# Patient Record
Sex: FEMALE | Race: BLACK OR AFRICAN AMERICAN | NOT HISPANIC OR LATINO | Employment: OTHER | URBAN - NONMETROPOLITAN AREA
[De-identification: names, ages, dates, MRNs, and addresses within clinical notes are randomized per-mention and may not be internally consistent; named-entity substitution may affect disease eponyms.]

---

## 2024-05-09 ENCOUNTER — OFFICE VISIT (OUTPATIENT)
Dept: CARDIOLOGY | Facility: CLINIC | Age: 74
End: 2024-05-09
Payer: COMMERCIAL

## 2024-05-09 VITALS
HEART RATE: 98 BPM | DIASTOLIC BLOOD PRESSURE: 82 MMHG | WEIGHT: 222 LBS | SYSTOLIC BLOOD PRESSURE: 146 MMHG | HEIGHT: 58 IN | BODY MASS INDEX: 46.6 KG/M2

## 2024-05-09 DIAGNOSIS — I10 ESSENTIAL HYPERTENSION: ICD-10-CM

## 2024-05-09 DIAGNOSIS — E78.2 MIXED HYPERLIPIDEMIA: ICD-10-CM

## 2024-05-09 DIAGNOSIS — R94.31 ABNORMAL EKG: ICD-10-CM

## 2024-05-09 DIAGNOSIS — Z87.891 FORMER SMOKER: ICD-10-CM

## 2024-05-09 DIAGNOSIS — R06.02 SHORTNESS OF BREATH: Primary | ICD-10-CM

## 2024-05-09 PROCEDURE — 3078F DIAST BP <80 MM HG: CPT | Performed by: INTERNAL MEDICINE

## 2024-05-09 PROCEDURE — 3077F SYST BP >= 140 MM HG: CPT | Performed by: INTERNAL MEDICINE

## 2024-05-09 PROCEDURE — 1160F RVW MEDS BY RX/DR IN RCRD: CPT | Performed by: INTERNAL MEDICINE

## 2024-05-09 PROCEDURE — 1036F TOBACCO NON-USER: CPT | Performed by: INTERNAL MEDICINE

## 2024-05-09 PROCEDURE — 93000 ELECTROCARDIOGRAM COMPLETE: CPT | Performed by: INTERNAL MEDICINE

## 2024-05-09 PROCEDURE — 99204 OFFICE O/P NEW MOD 45 MIN: CPT | Performed by: INTERNAL MEDICINE

## 2024-05-09 PROCEDURE — 1159F MED LIST DOCD IN RCRD: CPT | Performed by: INTERNAL MEDICINE

## 2024-05-09 RX ORDER — LISINOPRIL 5 MG/1
5 TABLET ORAL DAILY
COMMUNITY
Start: 2024-03-25

## 2024-05-09 RX ORDER — DEXTROMETHORPHAN HYDROBROMIDE, GUAIFENESIN 5; 100 MG/5ML; MG/5ML
650 LIQUID ORAL EVERY 8 HOURS PRN
COMMUNITY

## 2024-05-09 RX ORDER — IBUPROFEN 200 MG
200 TABLET ORAL EVERY 6 HOURS PRN
COMMUNITY

## 2024-05-09 RX ORDER — HYDROCHLOROTHIAZIDE 25 MG/1
25 TABLET ORAL DAILY
COMMUNITY
Start: 2024-03-25

## 2024-05-09 RX ORDER — ATORVASTATIN CALCIUM 80 MG/1
80 TABLET, FILM COATED ORAL NIGHTLY
COMMUNITY
Start: 2024-03-28

## 2024-05-09 RX ORDER — METOPROLOL SUCCINATE 25 MG/1
25 TABLET, EXTENDED RELEASE ORAL DAILY
Qty: 30 TABLET | Refills: 7 | Status: SHIPPED | OUTPATIENT
Start: 2024-05-09 | End: 2025-05-09

## 2024-05-09 ASSESSMENT — ENCOUNTER SYMPTOMS: DYSPNEA ON EXERTION: 1

## 2024-05-09 NOTE — PATIENT INSTRUCTIONS
Please bring all medicines, vitamins, and herbal supplements with you when you come to the office.    Prescriptions will not be filled unless you are compliant with your follow up appointments or have a follow up appointment scheduled as per instruction of your physician. Refills should be requested at the time of your visit.     BMI was above normal measurement. Current weight: 101 kg (222 lb)  Weight change since last visit (-) denotes wt loss 222 lbs   Weight loss needed to achieve BMI 25: 102.6 Lbs  Weight loss needed to achieve BMI 30: 78.8 Lbs  Provided instructions on dietary changes  Provided instructions on exercise.    Reduce salt  Lexiscan stress  Toprol  Follow up 3-4m

## 2024-05-09 NOTE — PROGRESS NOTES
Cardiology Consultation- New Consult    Reason for referral: Increasing shortness of breath    HPI: Ana Laura Saldana is a 73 y.o. female with known history of hypertension and hyperlipidemia who recently has been experiencing worsening shortness of breath.  She described almost functional class III.  She denies chest pain.  She denies lightheadedness, dizziness or syncope.  She is compliant with her medication.  She had long history of hypertension and hyperlipidemia.  I saw her about 10 years ago and her cardiac workup was normal.  Her recent echocardiogram, lab work and chest x-ray are all noted and reviewed with her.  Her EKG today showed normal sinus rhythm with poor R wave progression in the anterior lead.  Send reports symptoms of exertional palpitation and rapid heart rate with activity    Assessment    1.  Progressive shortness of breath functional class III with recent echocardiogram showing normal LV systolic function rule out underlying ischemic heart disease  2.  Essential hypertension suboptimally controlled  3.  Hyperlipidemia on treatment  4.  Symptoms of exertional palpitation  5.  Former smoker  6.  Abnormal EKG  7.  Morbid obesity    Assessment    1.  I advised the patient to start taking metoprolol ER 25 mg once daily to optimize blood pressure control  2.  I reviewed with the patient the result of her recent testing including lab work chest x-ray and echocardiogram  3.  I counseled her regarding nonpharmacologic approach for hypertension including salt restriction, dietary modification, DASH diet and the importance of losing weight  4.  Will proceed with Lexiscan myocardial fusion study  5.  Follow-up after testing is done      Past Medical History:   She has no past medical history on file.    Surgical History:   She has a past surgical history that includes  section, classic; [place] tooth extraction; and Cholecystectomy.    Family History:   Family History   Problem Relation Name  "Age of Onset    Cancer Mother      Hyperlipidemia Mother      Heart attack Father         Social History:   Social History     Tobacco Use    Smoking status: Former     Types: Cigarettes    Smokeless tobacco: Never   Substance Use Topics    Alcohol use: Yes     Alcohol/week: 7.0 standard drinks of alcohol     Types: 7 Glasses of wine per week        Allergies:  Patient has no known allergies.     Current Medications:    Current Outpatient Medications:     acetaminophen (Tylenol 8 HOUR) 650 mg ER tablet, Take 1 tablet (650 mg) by mouth every 8 hours if needed for mild pain (1 - 3). Do not crush, chew, or split., Disp: , Rfl:     atorvastatin (Lipitor) 80 mg tablet, Take 1 tablet (80 mg) by mouth once daily at bedtime., Disp: , Rfl:     hydroCHLOROthiazide (HYDRODiuril) 25 mg tablet, Take 1 tablet (25 mg) by mouth once daily., Disp: , Rfl:     ibuprofen 200 mg tablet, Take 1 tablet (200 mg) by mouth every 6 hours if needed for mild pain (1 - 3)., Disp: , Rfl:     lisinopril 5 mg tablet, Take 1 tablet (5 mg) by mouth once daily., Disp: , Rfl:     metoprolol succinate XL (Toprol-XL) 25 mg 24 hr tablet, Take 1 tablet (25 mg) by mouth once daily. Do not crush or chew., Disp: 30 tablet, Rfl: 7     Vitals:  Vitals:    05/09/24 1347 05/09/24 1351   BP: 146/76 146/82   BP Location: Left arm Right arm   Patient Position: Sitting Sitting   Pulse: 98 98   Weight: 101 kg (222 lb)    Height: 1.473 m (4' 10\")       EKG done in office today      Review of Systems   Cardiovascular:  Positive for dyspnea on exertion and leg swelling.       Objective         Physical Exam  Constitutional:       Appearance: Normal appearance.   HENT:      Nose: Nose normal.   Neck:      Vascular: No carotid bruit.   Cardiovascular:      Rate and Rhythm: Normal rate.      Pulses: Normal pulses.      Heart sounds: Normal heart sounds.   Pulmonary:      Effort: Pulmonary effort is normal.   Abdominal:      General: Bowel sounds are normal.      Palpations: " Abdomen is soft.   Musculoskeletal:         General: Normal range of motion.      Cervical back: Normal range of motion.      Right lower leg: No edema.      Left lower leg: No edema.   Skin:     General: Skin is warm and dry.   Neurological:      General: No focal deficit present.      Mental Status: She is alert.   Psychiatric:         Mood and Affect: Mood normal.         Behavior: Behavior normal.         Thought Content: Thought content normal.         Judgment: Judgment normal.                Assessment and Plan:   1. Shortness of breath  ECG 12 Lead    Nuclear Stress Test      2. Essential hypertension  Follow Up In Cardiology    ECG 12 Lead    metoprolol succinate XL (Toprol-XL) 25 mg 24 hr tablet      3. Mixed hyperlipidemia        4. BMI 45.0-49.9, adult (Multi)        5. Former smoker        6. Abnormal EKG               Scribe Attestation  By signing my name below, Lara AMRIE LPN, Scribe   attest that this documentation has been prepared under the direction and in the presence of Sheree Almendarez MD.    Provider Attestation - Scribe documentation    All medical record entries made by the Scribe were at my direction and personally dictated by me. I have reviewed the chart and agree that the record accurately reflects my personal performance of the history, physical exam, discussion and plan.

## 2024-05-28 ENCOUNTER — HOSPITAL ENCOUNTER (OUTPATIENT)
Dept: RADIOLOGY | Facility: CLINIC | Age: 74
Discharge: HOME | End: 2024-05-28
Payer: COMMERCIAL

## 2024-05-28 ENCOUNTER — HOSPITAL ENCOUNTER (OUTPATIENT)
Dept: CARDIOLOGY | Facility: CLINIC | Age: 74
Discharge: HOME | End: 2024-05-28
Payer: COMMERCIAL

## 2024-05-28 ENCOUNTER — HOSPITAL ENCOUNTER (OUTPATIENT)
Dept: RADIOLOGY | Facility: CLINIC | Age: 74
End: 2024-05-28
Payer: COMMERCIAL

## 2024-05-28 DIAGNOSIS — R06.02 SHORTNESS OF BREATH: ICD-10-CM

## 2024-05-28 RX ORDER — REGADENOSON 0.08 MG/ML
0.4 INJECTION, SOLUTION INTRAVENOUS ONCE
Status: DISCONTINUED | OUTPATIENT
Start: 2024-05-28 | End: 2024-05-30 | Stop reason: HOSPADM

## 2024-05-29 ENCOUNTER — APPOINTMENT (OUTPATIENT)
Dept: RADIOLOGY | Facility: CLINIC | Age: 74
End: 2024-05-29
Payer: COMMERCIAL

## 2024-05-29 ENCOUNTER — HOSPITAL ENCOUNTER (OUTPATIENT)
Dept: CARDIOLOGY | Facility: CLINIC | Age: 74
Discharge: HOME | End: 2024-05-29
Payer: COMMERCIAL

## 2024-05-29 ENCOUNTER — HOSPITAL ENCOUNTER (OUTPATIENT)
Dept: RADIOLOGY | Facility: CLINIC | Age: 74
Discharge: HOME | End: 2024-05-29
Payer: COMMERCIAL

## 2024-05-29 ENCOUNTER — HOSPITAL ENCOUNTER (OUTPATIENT)
Dept: RADIOLOGY | Facility: CLINIC | Age: 74
End: 2024-05-29
Payer: COMMERCIAL

## 2024-05-29 VITALS — SYSTOLIC BLOOD PRESSURE: 122 MMHG | HEART RATE: 73 BPM | DIASTOLIC BLOOD PRESSURE: 82 MMHG

## 2024-05-29 PROCEDURE — 2500000004 HC RX 250 GENERAL PHARMACY W/ HCPCS (ALT 636 FOR OP/ED): Performed by: INTERNAL MEDICINE

## 2024-05-29 PROCEDURE — A9502 TC99M TETROFOSMIN: HCPCS | Performed by: INTERNAL MEDICINE

## 2024-05-29 PROCEDURE — 3430000001 HC RX 343 DIAGNOSTIC RADIOPHARMACEUTICALS: Performed by: INTERNAL MEDICINE

## 2024-05-29 RX ORDER — REGADENOSON 0.08 MG/ML
0.4 INJECTION, SOLUTION INTRAVENOUS ONCE
Status: COMPLETED | OUTPATIENT
Start: 2024-05-29 | End: 2024-05-29

## 2024-05-29 RX ADMIN — REGADENOSON 0.4 MG: 0.08 INJECTION, SOLUTION INTRAVENOUS at 14:02

## 2024-05-29 RX ADMIN — TETROFOSMIN 35.1 MILLICURIE: 0.23 INJECTION, POWDER, LYOPHILIZED, FOR SOLUTION INTRAVENOUS at 14:03

## 2024-05-30 ENCOUNTER — HOSPITAL ENCOUNTER (OUTPATIENT)
Dept: RADIOLOGY | Facility: CLINIC | Age: 74
Discharge: HOME | End: 2024-05-30
Payer: COMMERCIAL

## 2024-05-30 PROCEDURE — 93017 CV STRESS TEST TRACING ONLY: CPT

## 2024-05-30 PROCEDURE — 78452 HT MUSCLE IMAGE SPECT MULT: CPT

## 2024-05-30 PROCEDURE — A9502 TC99M TETROFOSMIN: HCPCS | Performed by: INTERNAL MEDICINE

## 2024-05-30 PROCEDURE — 3430000001 HC RX 343 DIAGNOSTIC RADIOPHARMACEUTICALS: Performed by: INTERNAL MEDICINE

## 2024-05-30 RX ADMIN — TETROFOSMIN 34 MILLICURIE: 0.23 INJECTION, POWDER, LYOPHILIZED, FOR SOLUTION INTRAVENOUS at 14:26

## 2024-05-31 ENCOUNTER — TELEPHONE (OUTPATIENT)
Dept: CARDIOLOGY | Facility: CLINIC | Age: 74
End: 2024-05-31
Payer: COMMERCIAL

## 2024-05-31 NOTE — TELEPHONE ENCOUNTER
----- Message from Sheree Almendarez MD sent at 5/31/2024  8:59 AM EDT -----  Advise stress test is normal  ----- Message -----  From: Interface, Radiology Results In  Sent: 5/30/2024   5:17 PM EDT  To: Sheree Almendarez MD

## 2024-05-31 NOTE — TELEPHONE ENCOUNTER
Result Communication    Resulted Orders   Nuclear Stress Test    Narrative    Interpreted By:  Sheree Almendarez and Giannuzzi Michael   STUDY:  MYOCARDIAL PERFUSION STRESS TEST WITH LEXISCAN      Performing facility:  [facility],  [address] [place] [address], [address],  [place] [address]  [facility] Provider:  Sheree Almendarez MD  PCP:  Dr. ISHMAEL Kwok  Supervising provider:  Karyna Bello RN, CNP      INDICATION:  SOB;      HISTORY:  Gender:  F; Age:  74 y/o ; Height:  .3 cm cm; Weight:   WT  100.699 kg kg.      Abnormal EKG;  High Cholesterol;  HTN;  SOB;  CVA  Quit smoking 25 years ago.          COMPARISON:  Previous nuclear testing completed on2015 at [facility].          ACCESSION NUMBER(S):  [id number]      ORDERING CLINICIAN:  SHEREE ALMENDAREZ      TECHNIQUE:  TWO DAY protocol.  Stress injection: Date:5-29-24, 35.1 mCi of Myoview IV 20 seconds  after rapid injection of Lexiscan. Rest injection: Date: 5-30-24,  34.0 mCi of Myoview IV at rest. The patient had a rapid injection of  0.4 mg of Lexiscan IV over 10 seconds. Imaging was performed by  gated tomographic technique. Reason for Lexiscan:  dizziness/unsteady/fall risk      STRESS TEST DATA:  Resting heart rate was 73 BPM.  Resting blood pressure was 122/82 mmHg.  Peak blood pressure was 116/68 mmHg.  Peak heart rate was 93 BPM.      TEST TERMINATED DUE TO:  Protocol completed      FINDINGS:  STRESS TEST RESULTS:      Resting electrocardiogram revealed normal sinus rhythm with  nonspecific ST-T changes. There were no significant ischemic ECG  changes or dysrhythmias. The patient did not have chest  pains/symptoms during procedure. There was a normal recovery phase.      IMAGING RESULTS:      Image quality was good.  Rest and stress tomographic images were reviewed and revealed normal  perfusion without evidence of ischemia, myocardial infarction, or  left ventricular dilatation with stress. Overall left ventricular  systolic function  appeared to be normal without regional wall motion  abnormalities. Ejection fraction was 81%.  TID is 0.6 and is normal.  There were no evidence of  attenuation artifact.        Impression    Normal Lexiscan Myoview cardiac perfusion stress test.  No evidence of ischemia or myocardial infarction by perfusion imaging.  Normal left ventricular systolic function, ejection fraction 81%.  No change when compared to previous study.      Signed by: Sheree Almendarez 5/30/2024 5:16 PM  Dictation workstation:   [id number]       10:25 AM      Results were successfully communicated with the patient and they acknowledged their understanding.

## 2024-08-14 ENCOUNTER — APPOINTMENT (OUTPATIENT)
Dept: CARDIOLOGY | Facility: CLINIC | Age: 74
End: 2024-08-14
Payer: COMMERCIAL

## 2024-08-26 ENCOUNTER — APPOINTMENT (OUTPATIENT)
Dept: CARDIOLOGY | Facility: CLINIC | Age: 74
End: 2024-08-26
Payer: COMMERCIAL

## 2024-09-24 ENCOUNTER — APPOINTMENT (OUTPATIENT)
Dept: CARDIOLOGY | Facility: CLINIC | Age: 74
End: 2024-09-24
Payer: COMMERCIAL

## 2024-09-24 VITALS
SYSTOLIC BLOOD PRESSURE: 130 MMHG | BODY MASS INDEX: 45.97 KG/M2 | WEIGHT: 219 LBS | DIASTOLIC BLOOD PRESSURE: 70 MMHG | HEIGHT: 58 IN | HEART RATE: 72 BPM

## 2024-09-24 DIAGNOSIS — Z87.891 FORMER SMOKER: ICD-10-CM

## 2024-09-24 DIAGNOSIS — I10 ESSENTIAL HYPERTENSION: ICD-10-CM

## 2024-09-24 DIAGNOSIS — E78.2 MIXED HYPERLIPIDEMIA: ICD-10-CM

## 2024-09-24 DIAGNOSIS — R94.31 ABNORMAL EKG: ICD-10-CM

## 2024-09-24 DIAGNOSIS — R06.02 SHORTNESS OF BREATH: Primary | ICD-10-CM

## 2024-09-24 PROCEDURE — 3078F DIAST BP <80 MM HG: CPT | Performed by: INTERNAL MEDICINE

## 2024-09-24 PROCEDURE — 3008F BODY MASS INDEX DOCD: CPT | Performed by: INTERNAL MEDICINE

## 2024-09-24 PROCEDURE — 1160F RVW MEDS BY RX/DR IN RCRD: CPT | Performed by: INTERNAL MEDICINE

## 2024-09-24 PROCEDURE — 3075F SYST BP GE 130 - 139MM HG: CPT | Performed by: INTERNAL MEDICINE

## 2024-09-24 PROCEDURE — 1036F TOBACCO NON-USER: CPT | Performed by: INTERNAL MEDICINE

## 2024-09-24 PROCEDURE — 99214 OFFICE O/P EST MOD 30 MIN: CPT | Performed by: INTERNAL MEDICINE

## 2024-09-24 PROCEDURE — 1159F MED LIST DOCD IN RCRD: CPT | Performed by: INTERNAL MEDICINE

## 2024-09-24 NOTE — PROGRESS NOTES
"Tasha Saldana is a 73 y.o. female       Chief Complaint    Follow-up          HPI   Patient is here for follow-up and to management for recent evaluation for shortness of breath, hypertension, hyperlipidemia and exertional palpitation.  She was referred for an echocardiogram and stress test both were reassuring.  I placed her on low-dose beta-blockers and this resulted in marked improvement of her symptoms.  She denies chest pain, lightheadedness, dizziness or syncope.    Assessment     1.  Progressive shortness of breath functional class III with recent echocardiogram showing normal LV systolic function.  Recent stress test was negative.  Symptoms markedly improved after she was placed on low-dose beta-blocker  2.  Essential hypertension  much better controlled  3.  Hyperlipidemia on treatment  4.  Symptoms of exertional palpitation resolved after adding low-dose beta-blocker  5.  Former smoker  6.  Abnormal EKG  7.  Morbid obesity     Assessment     1.  Continue present medical regimen  2.  I reviewed with the patient the result of her recent testing including lab work chest x-ray , echocardiogram and stress test  3.  I counseled her regarding nonpharmacologic approach for hypertension including salt restriction, dietary modification, DASH diet and the importance of losing weight  4.  I encouraged her to see the weight loss clinic  5.  I will see her in 8 months  Review of Systems   All other systems reviewed and are negative.           Vitals:    09/24/24 1352   BP: 130/70   BP Location: Left arm   Patient Position: Sitting   Pulse: 72   Weight: 99.3 kg (219 lb)   Height: 1.473 m (4' 10\")        Objective   Physical Exam  Constitutional:       Appearance: Normal appearance.   HENT:      Nose: Nose normal.   Neck:      Vascular: No carotid bruit.   Cardiovascular:      Rate and Rhythm: Normal rate.      Pulses: Normal pulses.      Heart sounds: Normal heart sounds.   Pulmonary:      Effort: " Pulmonary effort is normal.   Abdominal:      General: Bowel sounds are normal.      Palpations: Abdomen is soft.   Musculoskeletal:         General: Normal range of motion.      Cervical back: Normal range of motion.      Right lower leg: No edema.      Left lower leg: No edema.   Skin:     General: Skin is warm and dry.   Neurological:      General: No focal deficit present.      Mental Status: She is alert.   Psychiatric:         Mood and Affect: Mood normal.         Behavior: Behavior normal.         Thought Content: Thought content normal.         Judgment: Judgment normal.         Allergies  Patient has no known allergies.     Current Medications    Current Outpatient Medications:     acetaminophen (Tylenol 8 HOUR) 650 mg ER tablet, Take 1 tablet (650 mg) by mouth every 8 hours if needed for mild pain (1 - 3). Do not crush, chew, or split., Disp: , Rfl:     atorvastatin (Lipitor) 80 mg tablet, Take 1 tablet (80 mg) by mouth once daily at bedtime., Disp: , Rfl:     hydroCHLOROthiazide (HYDRODiuril) 25 mg tablet, Take 1 tablet (25 mg) by mouth once daily., Disp: , Rfl:     ibuprofen 200 mg tablet, Take 1 tablet (200 mg) by mouth every 6 hours if needed for mild pain (1 - 3)., Disp: , Rfl:     lisinopril 5 mg tablet, Take 1 tablet (5 mg) by mouth once daily., Disp: , Rfl:     metoprolol succinate XL (Toprol-XL) 25 mg 24 hr tablet, Take 1 tablet (25 mg) by mouth once daily. Do not crush or chew., Disp: 30 tablet, Rfl: 7                     Assessment/Plan   1. Shortness of breath        2. Essential hypertension  Follow Up In Cardiology    Follow Up In Cardiology      3. Mixed hyperlipidemia        4. Abnormal EKG        5. BMI 45.0-49.9, adult (Multi)        6. Former smoker                 Scribe Attestation  By signing my name below, I, Joceline HERNANDEZ LPN   , Scribe   attest that this documentation has been prepared under the direction and in the presence of Sheree Almendarez MD.     Provider Attestation - Scribe  documentation    All medical record entries made by the Scribe were at my direction and personally dictated by me. I have reviewed the chart and agree that the record accurately reflects my personal performance of the history, physical exam, discussion and plan.

## 2024-09-24 NOTE — LETTER
September 24, 2024     Gautam Kwok DO  [address] [place] [address]    Patient: Ovi Saldana   YOB: 1950   Date of Visit: 9/24/2024       Dear Dr. Gautam Kwok DO:    Thank you for referring Ovi Saldana to me for evaluation. Below are my notes for this consultation.  If you have questions, please do not hesitate to call me. I look forward to following your patient along with you.       Sincerely,     Sheree Almendarez MD      CC: No Recipients  ______________________________________________________________________________________    Subjective   Ana Laura Saldana is a 73 y.o. female       Chief Complaint    Follow-up          HPI   Patient is here for follow-up and to management for recent evaluation for shortness of breath, hypertension, hyperlipidemia and exertional palpitation.  She was referred for an echocardiogram and stress test both were reassuring.  I placed her on low-dose beta-blockers and this resulted in marked improvement of her symptoms.  She denies chest pain, lightheadedness, dizziness or syncope.    Assessment     1.  Progressive shortness of breath functional class III with recent echocardiogram showing normal LV systolic function.  Recent stress test was negative.  Symptoms markedly improved after she was placed on low-dose beta-blocker  2.  Essential hypertension  much better controlled  3.  Hyperlipidemia on treatment  4.  Symptoms of exertional palpitation resolved after adding low-dose beta-blocker  5.  Former smoker  6.  Abnormal EKG  7.  Morbid obesity     Assessment     1.  Continue present medical regimen  2.  I reviewed with the patient the result of her recent testing including lab work chest x-ray , echocardiogram and stress test  3.  I counseled her regarding nonpharmacologic approach for hypertension including salt restriction, dietary modification, DASH diet and the importance of losing weight  4.  I encouraged her to see the weight  "loss clinic  5.  I will see her in 8 months  Review of Systems   All other systems reviewed and are negative.           Vitals:    09/24/24 1352   BP: 130/70   BP Location: Left arm   Patient Position: Sitting   Pulse: 72   Weight: 99.3 kg (219 lb)   Height: 1.473 m (4' 10\")        Objective   Physical Exam  Constitutional:       Appearance: Normal appearance.   HENT:      Nose: Nose normal.   Neck:      Vascular: No carotid bruit.   Cardiovascular:      Rate and Rhythm: Normal rate.      Pulses: Normal pulses.      Heart sounds: Normal heart sounds.   Pulmonary:      Effort: Pulmonary effort is normal.   Abdominal:      General: Bowel sounds are normal.      Palpations: Abdomen is soft.   Musculoskeletal:         General: Normal range of motion.      Cervical back: Normal range of motion.      Right lower leg: No edema.      Left lower leg: No edema.   Skin:     General: Skin is warm and dry.   Neurological:      General: No focal deficit present.      Mental Status: She is alert.   Psychiatric:         Mood and Affect: Mood normal.         Behavior: Behavior normal.         Thought Content: Thought content normal.         Judgment: Judgment normal.         Allergies  Patient has no known allergies.     Current Medications    Current Outpatient Medications:   •  acetaminophen (Tylenol 8 HOUR) 650 mg ER tablet, Take 1 tablet (650 mg) by mouth every 8 hours if needed for mild pain (1 - 3). Do not crush, chew, or split., Disp: , Rfl:   •  atorvastatin (Lipitor) 80 mg tablet, Take 1 tablet (80 mg) by mouth once daily at bedtime., Disp: , Rfl:   •  hydroCHLOROthiazide (HYDRODiuril) 25 mg tablet, Take 1 tablet (25 mg) by mouth once daily., Disp: , Rfl:   •  ibuprofen 200 mg tablet, Take 1 tablet (200 mg) by mouth every 6 hours if needed for mild pain (1 - 3)., Disp: , Rfl:   •  lisinopril 5 mg tablet, Take 1 tablet (5 mg) by mouth once daily., Disp: , Rfl:   •  metoprolol succinate XL (Toprol-XL) 25 mg 24 hr tablet, Take " 1 tablet (25 mg) by mouth once daily. Do not crush or chew., Disp: 30 tablet, Rfl: 7                     Assessment/Plan   1. Shortness of breath        2. Essential hypertension  Follow Up In Cardiology    Follow Up In Cardiology      3. Mixed hyperlipidemia        4. Abnormal EKG        5. BMI 45.0-49.9, adult (Multi)        6. Former smoker                 Scribe Attestation  By signing my name below, I, Ami Bautista LPNibchica   attest that this documentation has been prepared under the direction and in the presence of Sheree Almendarez MD.     Provider Attestation - Scribe documentation    All medical record entries made by the Scribe were at my direction and personally dictated by me. I have reviewed the chart and agree that the record accurately reflects my personal performance of the history, physical exam, discussion and plan.

## 2024-09-24 NOTE — PATIENT INSTRUCTIONS
Please bring all medicines, vitamins, and herbal supplements with you when you come to the office.    Prescriptions will not be filled unless you are compliant with your follow up appointments or have a follow up appointment scheduled as per instruction of your physician. Refills should be requested at the time of your visit.     BMI was above normal measurement. Current weight: 99.3 kg (219 lb)  Weight change since last visit (-) denotes wt loss -3 lbs   Weight loss needed to achieve BMI 25: 99.6 Lbs  Weight loss needed to achieve BMI 30: 75.8 Lbs  Provided instructions on dietary changes  Provided instructions on exercise.

## 2024-12-21 DIAGNOSIS — I10 ESSENTIAL HYPERTENSION: ICD-10-CM

## 2024-12-23 RX ORDER — METOPROLOL SUCCINATE 25 MG/1
25 TABLET, EXTENDED RELEASE ORAL DAILY
Qty: 90 TABLET | Refills: 3 | Status: SHIPPED | OUTPATIENT
Start: 2024-12-23 | End: 2025-12-23

## 2025-04-24 ENCOUNTER — APPOINTMENT (OUTPATIENT)
Dept: CARDIOLOGY | Facility: CLINIC | Age: 75
End: 2025-04-24
Payer: COMMERCIAL

## 2025-04-24 VITALS
HEIGHT: 58 IN | DIASTOLIC BLOOD PRESSURE: 70 MMHG | HEART RATE: 76 BPM | BODY MASS INDEX: 47.02 KG/M2 | WEIGHT: 224 LBS | SYSTOLIC BLOOD PRESSURE: 110 MMHG

## 2025-04-24 DIAGNOSIS — Z87.891 FORMER SMOKER: ICD-10-CM

## 2025-04-24 DIAGNOSIS — R06.02 SHORTNESS OF BREATH: Primary | ICD-10-CM

## 2025-04-24 DIAGNOSIS — I10 ESSENTIAL HYPERTENSION: ICD-10-CM

## 2025-04-24 DIAGNOSIS — R94.31 ABNORMAL EKG: ICD-10-CM

## 2025-04-24 DIAGNOSIS — E78.2 MIXED HYPERLIPIDEMIA: ICD-10-CM

## 2025-04-24 PROCEDURE — 3078F DIAST BP <80 MM HG: CPT | Performed by: INTERNAL MEDICINE

## 2025-04-24 PROCEDURE — 3074F SYST BP LT 130 MM HG: CPT | Performed by: INTERNAL MEDICINE

## 2025-04-24 PROCEDURE — 1159F MED LIST DOCD IN RCRD: CPT | Performed by: INTERNAL MEDICINE

## 2025-04-24 PROCEDURE — 3008F BODY MASS INDEX DOCD: CPT | Performed by: INTERNAL MEDICINE

## 2025-04-24 PROCEDURE — 1160F RVW MEDS BY RX/DR IN RCRD: CPT | Performed by: INTERNAL MEDICINE

## 2025-04-24 PROCEDURE — 1036F TOBACCO NON-USER: CPT | Performed by: INTERNAL MEDICINE

## 2025-04-24 PROCEDURE — G2211 COMPLEX E/M VISIT ADD ON: HCPCS | Performed by: INTERNAL MEDICINE

## 2025-04-24 PROCEDURE — 99213 OFFICE O/P EST LOW 20 MIN: CPT | Performed by: INTERNAL MEDICINE

## 2025-04-24 NOTE — LETTER
April 24, 2025     Gautam Kwok DO  [address] [place] [address]    Patient: Ovi Saldana   YOB: 1950   Date of Visit: 4/24/2025       Dear Dr. Gautam Kwok DO:    Thank you for referring Ovi Saldana to me for evaluation. Below are my notes for this consultation.  If you have questions, please do not hesitate to call me. I look forward to following your patient along with you.       Sincerely,     Sheree Almendarez MD      CC: No Recipients  ______________________________________________________________________________________    Chief Complaint   Patient presents with   • Follow-up     8 months for essential hypertension       Subjective   Ana Laura Saldana is a 74 y.o. female     HPI   Patient is here for follow-up continue management for previous evaluation for shortness of breath, hypertension hyperlipidemia.  Her cardiac workup was benign and her symptoms felt likely due to deconditioning and obesity.  Her symptoms partially improved with low-dose beta-blocker.  She denies chest pain, lightheadedness, dizziness or syncope.  Currently functional class II.  Prior evaluation for  Assessment     1.  Progressive shortness of breath functional class III with recent echocardiogram showing normal LV systolic function.  Recent stress test was negative.  Symptoms markedly improved after she was placed on low-dose beta-blocker currently functional class II and stable  2.  Essential hypertension  much better controlled  3.  Hyperlipidemia on treatment  4.  Symptoms of exertional palpitation resolved after adding low-dose beta-blocker  5.  Former smoker  6.  Abnormal EKG  7.  Morbid obesity     Assessment     1.  Continue present medical regimen  2.  I reviewed with the patient the result of her recent testing including lab work chest x-ray , echocardiogram and stress test  3.  I counseled her regarding nonpharmacologic approach for hypertension including salt restriction,  "dietary modification, DASH diet and the importance of losing weight  4.  I encouraged her to see the weight loss clinic or consider Mounjaro or Ozempic  5.  I will see her in 9 months with an EKG  Review of Systems   All other systems reviewed and are negative.           Vitals:    04/24/25 1350   BP: 110/70   BP Location: Left arm   Patient Position: Sitting   Pulse: 76   Weight: 102 kg (224 lb)   Height: 1.473 m (4' 10\")        Objective   Physical Exam  Constitutional:       Appearance: Normal appearance.   HENT:      Nose: Nose normal.   Neck:      Vascular: No carotid bruit.   Cardiovascular:      Rate and Rhythm: Normal rate.      Pulses: Normal pulses.      Heart sounds: Normal heart sounds.   Pulmonary:      Effort: Pulmonary effort is normal.   Abdominal:      General: Bowel sounds are normal.      Palpations: Abdomen is soft.   Musculoskeletal:         General: Normal range of motion.      Cervical back: Normal range of motion.      Right lower leg: No edema.      Left lower leg: No edema.   Skin:     General: Skin is warm and dry.   Neurological:      General: No focal deficit present.      Mental Status: She is alert.   Psychiatric:         Mood and Affect: Mood normal.         Behavior: Behavior normal.         Thought Content: Thought content normal.         Judgment: Judgment normal.         Allergies  Patient has no known allergies.     Current Medications  Current Outpatient Medications   Medication Instructions   • acetaminophen (TYLENOL 8 HOUR) 650 mg, Every 8 hours PRN   • atorvastatin (LIPITOR) 80 mg, Nightly   • hydroCHLOROthiazide (HYDRODIURIL) 25 mg, Daily   • ibuprofen 200 mg, Every 6 hours PRN   • lisinopril 5 mg, Daily   • metoprolol succinate XL (TOPROL-XL) 25 mg, oral, Daily, DO NOT CRUSH OR CHEW                        Assessment/Plan   1. Shortness of breath        2. Essential hypertension  Follow Up In Cardiology    Follow Up In Cardiology      3. Mixed hyperlipidemia        4. " Abnormal EKG        5. BMI 45.0-49.9, adult (Multi)        6. Former smoker                 Scribe Attestation  By signing my name below, ILara LPN   , Leilani   attest that this documentation has been prepared under the direction and in the presence of Sheree Almendarez MD.     Provider Attestation - Scribe documentation    All medical record entries made by the Scribe were at my direction and personally dictated by me. I have reviewed the chart and agree that the record accurately reflects my personal performance of the history, physical exam, discussion and plan.

## 2025-04-24 NOTE — PROGRESS NOTES
"Chief Complaint   Patient presents with    Follow-up     8 months for essential hypertension       Subjective   Ana Laura Saldana is a 74 y.o. female     HPI   Patient is here for follow-up continue management for previous evaluation for shortness of breath, hypertension hyperlipidemia.  Her cardiac workup was benign and her symptoms felt likely due to deconditioning and obesity.  Her symptoms partially improved with low-dose beta-blocker.  She denies chest pain, lightheadedness, dizziness or syncope.  Currently functional class II.  Prior evaluation for  Assessment     1.  Progressive shortness of breath functional class III with recent echocardiogram showing normal LV systolic function.  Recent stress test was negative.  Symptoms markedly improved after she was placed on low-dose beta-blocker currently functional class II and stable  2.  Essential hypertension  much better controlled  3.  Hyperlipidemia on treatment  4.  Symptoms of exertional palpitation resolved after adding low-dose beta-blocker  5.  Former smoker  6.  Abnormal EKG  7.  Morbid obesity     Assessment     1.  Continue present medical regimen  2.  I reviewed with the patient the result of her recent testing including lab work chest x-ray , echocardiogram and stress test  3.  I counseled her regarding nonpharmacologic approach for hypertension including salt restriction, dietary modification, DASH diet and the importance of losing weight  4.  I encouraged her to see the weight loss clinic or consider Mounjaro or Ozempic  5.  I will see her in 9 months with an EKG  Review of Systems   All other systems reviewed and are negative.           Vitals:    04/24/25 1350   BP: 110/70   BP Location: Left arm   Patient Position: Sitting   Pulse: 76   Weight: 102 kg (224 lb)   Height: 1.473 m (4' 10\")        Objective   Physical Exam  Constitutional:       Appearance: Normal appearance.   HENT:      Nose: Nose normal.   Neck:      Vascular: No carotid " bruit.   Cardiovascular:      Rate and Rhythm: Normal rate.      Pulses: Normal pulses.      Heart sounds: Normal heart sounds.   Pulmonary:      Effort: Pulmonary effort is normal.   Abdominal:      General: Bowel sounds are normal.      Palpations: Abdomen is soft.   Musculoskeletal:         General: Normal range of motion.      Cervical back: Normal range of motion.      Right lower leg: No edema.      Left lower leg: No edema.   Skin:     General: Skin is warm and dry.   Neurological:      General: No focal deficit present.      Mental Status: She is alert.   Psychiatric:         Mood and Affect: Mood normal.         Behavior: Behavior normal.         Thought Content: Thought content normal.         Judgment: Judgment normal.         Allergies  Patient has no known allergies.     Current Medications  Current Outpatient Medications   Medication Instructions    acetaminophen (TYLENOL 8 HOUR) 650 mg, Every 8 hours PRN    atorvastatin (LIPITOR) 80 mg, Nightly    hydroCHLOROthiazide (HYDRODIURIL) 25 mg, Daily    ibuprofen 200 mg, Every 6 hours PRN    lisinopril 5 mg, Daily    metoprolol succinate XL (TOPROL-XL) 25 mg, oral, Daily, DO NOT CRUSH OR CHEW                        Assessment/Plan   1. Shortness of breath        2. Essential hypertension  Follow Up In Cardiology    Follow Up In Cardiology      3. Mixed hyperlipidemia        4. Abnormal EKG        5. BMI 45.0-49.9, adult (Multi)        6. Former smoker                 Scribe Attestation  By signing my name below, Lara MARIE LPN, Scribe   attest that this documentation has been prepared under the direction and in the presence of Sheree Almendarez MD.     Provider Attestation - Scribe documentation    All medical record entries made by the Scribe were at my direction and personally dictated by me. I have reviewed the chart and agree that the record accurately reflects my personal performance of the history, physical exam, discussion and plan.

## 2025-04-24 NOTE — PATIENT INSTRUCTIONS
Fall Prevention Education Given Please bring all medicines, vitamins, and herbal supplements with you when you come to the office.    Prescriptions will not be filled unless you are compliant with your follow up appointments or have a follow up appointment scheduled as per instruction of your physician. Refills should be requested at the time of your visit.     BMI was above normal measurement. Current weight: 102 kg (224 lb)  Weight change since last visit (-) denotes wt loss 5 lbs   Weight loss needed to achieve BMI 25: 104.6 Lbs  Weight loss needed to achieve BMI 30: 80.8 Lbs  Provided instructions on dietary changes  Provided instructions on exercise.    8 months

## 2026-01-14 ENCOUNTER — APPOINTMENT (OUTPATIENT)
Dept: CARDIOLOGY | Facility: CLINIC | Age: 76
End: 2026-01-14
Payer: COMMERCIAL